# Patient Record
Sex: FEMALE | Race: WHITE
[De-identification: names, ages, dates, MRNs, and addresses within clinical notes are randomized per-mention and may not be internally consistent; named-entity substitution may affect disease eponyms.]

---

## 2017-01-13 ENCOUNTER — HOSPITAL ENCOUNTER (OUTPATIENT)
Dept: HOSPITAL 53 - M LABNEURO | Age: 62
Discharge: HOME | End: 2017-01-13
Attending: PSYCHIATRY & NEUROLOGY
Payer: COMMERCIAL

## 2017-01-13 DIAGNOSIS — G46.7: ICD-10-CM

## 2017-01-13 DIAGNOSIS — G40.009: ICD-10-CM

## 2017-01-13 DIAGNOSIS — M43.06: Primary | ICD-10-CM

## 2017-01-13 LAB
ALBUMIN SERPL BCG-MCNC: 3.7 GM/DL (ref 3.2–5.2)
ALBUMIN/GLOB SERPL: 1.23 {RATIO} (ref 1–1.93)
ALP SERPL-CCNC: 84 U/L (ref 45–117)
ALT SERPL W P-5'-P-CCNC: 24 U/L (ref 12–78)
ANION GAP SERPL CALC-SCNC: 7 MEQ/L (ref 8–16)
AST SERPL-CCNC: 11 U/L (ref 15–37)
BASOPHILS # BLD AUTO: 0 K/MM3 (ref 0–0.2)
BASOPHILS NFR BLD AUTO: 0.7 % (ref 0–1)
BILIRUB SERPL-MCNC: 0.4 MG/DL (ref 0.2–1)
BUN SERPL-MCNC: 17 MG/DL (ref 7–18)
CALCIUM SERPL-MCNC: 8.9 MG/DL (ref 8.8–10.2)
CHLORIDE SERPL-SCNC: 102 MEQ/L (ref 98–107)
CO2 SERPL-SCNC: 31 MEQ/L (ref 21–32)
CREAT SERPL-MCNC: 0.77 MG/DL (ref 0.55–1.02)
EOSINOPHIL # BLD AUTO: 0.1 K/MM3 (ref 0–0.5)
EOSINOPHIL NFR BLD AUTO: 1.8 % (ref 0–3)
ERYTHROCYTE [DISTWIDTH] IN BLOOD BY AUTOMATED COUNT: 11.9 % (ref 11.5–14.5)
GFR SERPL CREATININE-BSD FRML MDRD: > 60 ML/MIN/{1.73_M2} (ref 45–?)
GLUCOSE SERPL-MCNC: 97 MG/DL (ref 80–110)
LARGE UNSTAINED CELL #: 0.2 K/MM3 (ref 0–0.4)
LARGE UNSTAINED CELL %: 2.3 % (ref 0–4)
LYMPHOCYTES # BLD AUTO: 1.7 K/MM3 (ref 1.5–4.5)
LYMPHOCYTES NFR BLD AUTO: 25 % (ref 24–44)
MCH RBC QN AUTO: 32.4 PG (ref 27–33)
MCHC RBC AUTO-ENTMCNC: 33.5 G/DL (ref 32–36.5)
MCV RBC AUTO: 96.9 FL (ref 80–96)
MONOCYTES # BLD AUTO: 0.4 K/MM3 (ref 0–0.8)
MONOCYTES NFR BLD AUTO: 5.9 % (ref 0–5)
NEUTROPHILS # BLD AUTO: 4.4 K/MM3 (ref 1.8–7.7)
NEUTROPHILS NFR BLD AUTO: 64.3 % (ref 36–66)
PLATELET # BLD AUTO: 296 K/MM3 (ref 150–450)
POTASSIUM SERPL-SCNC: 4.3 MEQ/L (ref 3.5–5.1)
PROT SERPL-MCNC: 6.7 GM/DL (ref 6.4–8.2)
SODIUM SERPL-SCNC: 140 MEQ/L (ref 136–145)
WBC # BLD AUTO: 6.8 K/MM3 (ref 4–10)

## 2017-01-31 ENCOUNTER — HOSPITAL ENCOUNTER (OUTPATIENT)
Dept: HOSPITAL 53 - M WHC | Age: 62
End: 2017-01-31
Attending: NURSE PRACTITIONER
Payer: COMMERCIAL

## 2017-01-31 ENCOUNTER — HOSPITAL ENCOUNTER (OUTPATIENT)
Dept: HOSPITAL 53 - M SFHCWAGY | Age: 62
Discharge: HOME | End: 2017-01-31
Attending: NURSE PRACTITIONER
Payer: COMMERCIAL

## 2017-01-31 DIAGNOSIS — Z12.31: Primary | ICD-10-CM

## 2017-01-31 DIAGNOSIS — Z12.4: Primary | ICD-10-CM

## 2017-01-31 NOTE — REPMRS
Patient History

The patient states she had a clinical breast exam in 1-2017

Patient is postmenopausal.

Family history of endometrial cancer in mother at age 34 and 

breast cancer in maternal cousin at age 50 or over.

Benign excisional biopsy of the left breast.

 

Digital Woman Screen Mammo: January 31, 2017 - Exam #: 

[id number]

Bilateral CC and MLO view(s) were taken.

 

Technologist: Cinthya Edouard, Technologist

Prior study comparison: December 18, 2015, digital woman screen 

mammo performed at [facility].  November 25, 2014, 

digital woman screen mammo performed at [facility].

September 18, 2013, digital woman screen mammo performed at 

[facility].

 

FINDINGS: There are scattered fibroglandular densities.  There 

has been no change in the appearance of the mammogram from the 

prior studies.  There is a mild amount of scattered 

fibroglandular density which is fairly symmetric. There is no 

interval development of dominant mass, architectural distortion, 

or clustered microcalcification suggestive of malignancy.

 

ASSESSMENT: BI-RADS/ACR category 1 mammogram. Negative.

 

Recommendation

Routine screening mammogram in 1 year (for women over age 40).

This mammogram was interpreted with the aid of an FDA-approved 

computer-aided dectection system.

 

Electronically Signed By: Roddy Novoa MD 01/31/17 0407

## 2017-02-16 ENCOUNTER — HOSPITAL ENCOUNTER (OUTPATIENT)
Dept: HOSPITAL 53 - M SFHCLUC | Age: 62
Discharge: HOME | End: 2017-02-16
Attending: NURSE PRACTITIONER
Payer: COMMERCIAL

## 2017-02-16 DIAGNOSIS — J40: Primary | ICD-10-CM

## 2017-04-10 ENCOUNTER — HOSPITAL ENCOUNTER (OUTPATIENT)
Dept: HOSPITAL 53 - M SFHCLERA | Age: 62
End: 2017-04-10
Attending: NURSE PRACTITIONER
Payer: COMMERCIAL

## 2017-04-10 ENCOUNTER — HOSPITAL ENCOUNTER (OUTPATIENT)
Dept: HOSPITAL 53 - M WHC | Age: 62
End: 2017-04-10
Attending: PHYSICIAN ASSISTANT
Payer: COMMERCIAL

## 2017-04-10 DIAGNOSIS — M85.80: ICD-10-CM

## 2017-04-10 DIAGNOSIS — Z13.820: Primary | ICD-10-CM

## 2017-04-10 DIAGNOSIS — M81.0: ICD-10-CM

## 2017-04-10 DIAGNOSIS — R30.0: Primary | ICD-10-CM

## 2017-04-12 NOTE — DEXA
AP SPINE   L1 - L4   1.125   -0.6       0.9

LT FEMUR   TOTAL   0.816   -1.5      -0.4

RT FEMUR   TOTAL   0.839   -1.3      -0.8

TOTAL BODY   TOTAL

OTHER



DUAL FEMUR FRAX* ASSESSMENT

Risk factors:               EtOH/tobacco use.

10 year probability of fracture

Major osteoporotic fracture            18.5 %

Hip fracture                 7.6 %



COMMENTS:

Normal bone densitometry of the spine.

There is low bone density of the right hip.

There is osteoporosis of the left hip.

The density of the spine has increased 10.0% since 9/2013.

The density of the left hip has decreased 1.9% since 9/2013.

The density of the right hip has decreased 0.8% since 9/2003.

The increased density of the spine does represent a significant change.

The decreased density of the left hip does not represent a significant change.

The decreased density of the right hip does not represent a significant change.



FOLLOW-UP:

Recommendation for the next bone density exam: 2 years.
[facility]

## 2018-01-17 ENCOUNTER — HOSPITAL ENCOUNTER (OUTPATIENT)
Dept: HOSPITAL 53 - M LABNEURO | Age: 63
End: 2018-01-17
Attending: PSYCHIATRY & NEUROLOGY
Payer: COMMERCIAL

## 2018-01-17 DIAGNOSIS — M43.06: Primary | ICD-10-CM

## 2018-01-17 DIAGNOSIS — G40.009: ICD-10-CM

## 2018-01-17 DIAGNOSIS — G46.7: ICD-10-CM

## 2018-01-17 LAB
ALBUMIN/GLOBULIN RATIO: 1.29 (ref 1–1.93)
ALBUMIN: 4 GM/DL (ref 3.2–5.2)
ALKALINE PHOSPHATASE: 86 U/L (ref 45–117)
ALT SERPL W P-5'-P-CCNC: 29 U/L (ref 12–78)
ANION GAP: 8 MEQ/L (ref 8–16)
AST SERPL-CCNC: 21 U/L (ref 7–37)
BASO #: 0.1 10^3/UL (ref 0–0.2)
BASO %: 0.9 % (ref 0–1)
BILIRUBIN,TOTAL: 0.4 MG/DL (ref 0.2–1)
BLOOD UREA NITROGEN: 19 MG/DL (ref 7–18)
CALCIUM LEVEL: 9.3 MG/DL (ref 8.8–10.2)
CARBON DIOXIDE LEVEL: 28 MEQ/L (ref 21–32)
CHLORIDE LEVEL: 102 MEQ/L (ref 98–107)
CREATININE FOR GFR: 0.77 MG/DL (ref 0.55–1.02)
EOS #: 0.1 10^3/UL (ref 0–0.5)
EOSINOPHIL NFR BLD AUTO: 2.4 % (ref 0–3)
EST. AVERAGE GLUCOSE BLD GHB EST-MCNC: 108 MG/DL (ref 60–110)
GFR SERPL CREATININE-BSD FRML MDRD: > 60 ML/MIN/{1.73_M2} (ref 45–?)
GLUCOSE, FASTING: 112 MG/DL (ref 80–110)
HEMATOCRIT: 41.5 % (ref 36–47)
HEMOGLOBIN: 14 G/DL (ref 12–16)
IMMATURE GRANULOCYTE #: 0 10^3/UL (ref 0–0)
IMMATURE GRANULOCYTE %: 0.2 % (ref 0–0)
LYMPH #: 1.8 10^3/UL (ref 1.5–4.5)
LYMPH %: 32 % (ref 24–44)
MEAN CORPUSCULAR HEMOGLOBIN: 32.3 PG (ref 27–33)
MEAN CORPUSCULAR HGB CONC: 33.7 G/DL (ref 32–36.5)
MEAN CORPUSCULAR VOLUME: 95.8 FL (ref 80–96)
MONO #: 0.6 10^3/UL (ref 0–0.8)
MONO %: 10.1 % (ref 0–5)
NEUTROPHILS #: 3.1 10^3/UL (ref 1.8–7.7)
NEUTROPHILS %: 54.4 % (ref 36–66)
NRBC BLD AUTO-RTO: 0 % (ref 0–0)
PLATELET COUNT, AUTOMATED: 280 10^3/UL (ref 150–450)
POTASSIUM SERUM: 4.2 MEQ/L (ref 3.5–5.1)
RED BLOOD COUNT: 4.33 10^6/UL (ref 4–5.4)
RED CELL DISTRIBUTION WIDTH: 12.7 % (ref 11.5–14.5)
SODIUM LEVEL: 138 MEQ/L (ref 136–145)
TOTAL PROTEIN: 7.1 GM/DL (ref 6.4–8.2)
WHITE BLOOD COUNT: 5.7 10^3/UL (ref 4–10)

## 2018-01-20 LAB — LAMOTRIGINE SERPL-MCNC: 8.5 UG/ML (ref 2–20)

## 2018-03-20 ENCOUNTER — HOSPITAL ENCOUNTER (OUTPATIENT)
Dept: HOSPITAL 53 - M SFHCWAGY | Age: 63
End: 2018-03-20
Attending: NURSE PRACTITIONER
Payer: COMMERCIAL

## 2018-03-20 ENCOUNTER — HOSPITAL ENCOUNTER (OUTPATIENT)
Dept: HOSPITAL 53 - M WHC | Age: 63
End: 2018-03-20
Attending: FAMILY MEDICINE
Payer: COMMERCIAL

## 2018-03-20 DIAGNOSIS — Z98.890: ICD-10-CM

## 2018-03-20 DIAGNOSIS — Z78.0: ICD-10-CM

## 2018-03-20 DIAGNOSIS — Z12.4: Primary | ICD-10-CM

## 2018-03-20 DIAGNOSIS — Z12.31: Primary | ICD-10-CM

## 2018-03-20 PROCEDURE — 77067 SCR MAMMO BI INCL CAD: CPT

## 2019-01-08 ENCOUNTER — HOSPITAL ENCOUNTER (OUTPATIENT)
Dept: HOSPITAL 53 - M LRY | Age: 64
End: 2019-01-08
Attending: FAMILY MEDICINE
Payer: COMMERCIAL

## 2019-01-08 DIAGNOSIS — M47.817: ICD-10-CM

## 2019-01-08 DIAGNOSIS — M70.61: Primary | ICD-10-CM

## 2019-01-08 DIAGNOSIS — M54.5: ICD-10-CM

## 2019-01-09 NOTE — REP
Clinical:  Lower back pain .

 

Technique:  AP, lateral, flexion/extension , bilateral oblique, and coned-down

views.

 

Findings:  Alignment and lordosis is maintained.  The vertebral bodies including

transverse process and spinous processes are intact and there is no evidence for

acute fracture / compression injury or subluxation.  Early advanced multilevel

degenerative changes include endplate sclerosis, disc space narrowing, and

hypertrophic facet changes most notably at the L5-S1, L3-4, L4-5.  No

spondylolysis.

 

Impression:

Early advanced multilevel degenerative spondylosis.

 

 

Electronically Signed by

Wilmer Carrasco MD 01/09/2019 02:43 A

## 2019-01-09 NOTE — REP
Clinical:  Bursitis.

 

Technique:  Neutral and frog lateral views of the right hip.

 

Findings:

Osseous structures, joint spaces, and surrounding soft tissues appear normal.  No

acute fracture dislocation.  No overt arthritic changes noted.

 

Impression:

Normal, age-appropriate right hip radiographs.

 

 

Electronically Signed by

Wilmer Carrasco MD 01/09/2019 03:57 A

## 2019-03-28 ENCOUNTER — HOSPITAL ENCOUNTER (OUTPATIENT)
Dept: HOSPITAL 53 - M WHC | Age: 64
End: 2019-03-28
Attending: NURSE PRACTITIONER
Payer: COMMERCIAL

## 2019-03-28 DIAGNOSIS — Z12.31: Primary | ICD-10-CM

## 2019-03-28 DIAGNOSIS — Z78.0: ICD-10-CM

## 2019-03-28 DIAGNOSIS — Z80.3: ICD-10-CM

## 2019-03-28 NOTE — REPMRS
Patient History

The patient states she had a clinical breast exam in 03/2019.

Patient is postmenopausal.

Family history of breast cancer at age 50 or over in maternal 

cousin, endometrial cancer at age 34 in mother.

Benign excisional biopsy of the left breast.

No Hormone Replacement Therapy

 

3D TOMOSYNTHESIS WAS PERFORMED.

 

Digital Woman Screen Mammo: March 28, 2019 - Exam #: 

[id number]

Bilateral CC and MLO view(s) were taken.

 

Technologist: Ania Zarate, Technologist

Prior study comparison: March 20, 2018, digital woman screen 

mammo performed at [facility].  January 31, 2017, digital woman screen mammo performed at [facility].

 

FINDINGS: The breast tissue is heterogeneously dense.  This may 

lower the sensitivity of mammography.  There has been no change 

in the appearance of the mammogram from the prior studies.  There

is a moderate amount of residual fibroglandular tissue which is 

fairly symmetric. There is no interval development of dominant 

mass, areas of architectural distortion, or clustered 

microcalcification typical of malignancy.

 

Assessment: BI-RADS/ACR category 1 mammogram. Negative Mammogram.

 

Recommendation

Routine screening mammogram in 1 year (for women over age 40).

This mammogram was interpreted with the aid of an FDA-approved 

computer-aided dectection system.

 

Electronically Signed By: Urban Boone MD 03/28/19 [id number]

## 2019-04-24 ENCOUNTER — HOSPITAL ENCOUNTER (OUTPATIENT)
Dept: HOSPITAL 53 - M WHC | Age: 64
End: 2019-04-24
Attending: NURSE PRACTITIONER
Payer: COMMERCIAL

## 2019-04-24 DIAGNOSIS — F17.200: ICD-10-CM

## 2019-04-24 DIAGNOSIS — Z78.0: ICD-10-CM

## 2019-04-24 DIAGNOSIS — M81.8: Primary | ICD-10-CM

## 2019-04-25 NOTE — DEXA
AP SPINE   L1 - L4   1.108   -0.7       0.8

LT FEMUR   TOTAL   0.845   -1.3      -0.2

LT NECK      0.712   -2.3      -0.9

RT FEMUR   TOTAL   0.836   -1.4      -0.2      

RT NECK      0.708   -2.4      -1.0

TOTAL BODY   TOTAL

OTHER



COMMENTS:

Normal bone densitometry of the spine.

There is low bone density of the hips.

The density of the spine has increased 8.3% since the initial exam on 
09/18/2013.

The spine density has decreased 1.5% since the most recent exam on 04/10/2017.

The density of the left hip has increased 1.6% since the initial exam on 
09/18/2013.

The density of the left hip has increased 3.6% since the most recent exam on 
04/10/2017.

The density of the right hip has decreased 1.2% since the initial exam on 
09/18/2013.

The density of the right hip has decreased her 0.4% since the most recent exam 
on 04/10/2017.



FOLLOW-UP:

Recommendation for the next bone density exam: 2 years.



[facility]

## 2019-10-28 ENCOUNTER — HOSPITAL ENCOUNTER (OUTPATIENT)
Dept: HOSPITAL 53 - M SFHCLERA | Age: 64
End: 2019-10-28
Attending: NURSE PRACTITIONER
Payer: COMMERCIAL

## 2019-10-28 DIAGNOSIS — J04.0: Primary | ICD-10-CM

## 2019-11-01 ENCOUNTER — HOSPITAL ENCOUNTER (OUTPATIENT)
Dept: HOSPITAL 53 - M LRY | Age: 64
End: 2019-11-01
Attending: FAMILY MEDICINE
Payer: COMMERCIAL

## 2019-11-01 DIAGNOSIS — R05: Primary | ICD-10-CM

## 2019-11-01 NOTE — REP
Clinical:  Productive cough .

 

Comparison: 06/24/2014

 

Technique:  PA and lateral.

 

Findings:

The mediastinum and cardiac silhouette are normal.  The lung fields are clear and

without acute consolidation, effusion, or pneumothorax.  The skeletal structures

are intact and normal.

 

Impression:

1.   No acute cardiopulmonary process.

 

 

Electronically Signed by

Wilmer Carrasco MD 11/01/2019 12:26 P

## 2019-12-02 ENCOUNTER — HOSPITAL ENCOUNTER (OUTPATIENT)
Dept: HOSPITAL 53 - M SFHCLERA | Age: 64
End: 2019-12-02
Attending: FAMILY MEDICINE
Payer: COMMERCIAL

## 2019-12-02 DIAGNOSIS — G40.909: ICD-10-CM

## 2019-12-02 DIAGNOSIS — Z90.09: ICD-10-CM

## 2019-12-02 DIAGNOSIS — M47.816: Primary | ICD-10-CM

## 2019-12-02 DIAGNOSIS — Z13.220: ICD-10-CM

## 2019-12-02 LAB
ALBUMIN SERPL BCG-MCNC: 4 GM/DL (ref 3.2–5.2)
ALT SERPL W P-5'-P-CCNC: 28 U/L (ref 12–78)
BILIRUB CONJ SERPL-MCNC: 0.1 MG/DL (ref 0–0.2)
BILIRUB SERPL-MCNC: 0.4 MG/DL (ref 0.2–1)
BUN SERPL-MCNC: 14 MG/DL (ref 7–18)
CALCIUM SERPL-MCNC: 9.1 MG/DL (ref 8.8–10.2)
CHLORIDE SERPL-SCNC: 102 MEQ/L (ref 98–107)
CHOLEST SERPL-MCNC: 207 MG/DL (ref ?–200)
CHOLEST/HDLC SERPL: 2.23 {RATIO} (ref ?–5)
CO2 SERPL-SCNC: 31 MEQ/L (ref 21–32)
CREAT SERPL-MCNC: 0.72 MG/DL (ref 0.55–1.3)
GFR SERPL CREATININE-BSD FRML MDRD: > 60 ML/MIN/{1.73_M2} (ref 45–?)
GLUCOSE SERPL-MCNC: 88 MG/DL (ref 70–100)
HDLC SERPL-MCNC: 93 MG/DL (ref 40–?)
LDLC SERPL CALC-MCNC: 87 MG/DL (ref ?–100)
NONHDLC SERPL-MCNC: 114 MG/DL
POTASSIUM SERPL-SCNC: 4.3 MEQ/L (ref 3.5–5.1)
PROT SERPL-MCNC: 7.2 GM/DL (ref 6.4–8.2)
SODIUM SERPL-SCNC: 138 MEQ/L (ref 136–145)
TRIGL SERPL-MCNC: 135 MG/DL (ref ?–150)
TSH SERPL DL<=0.005 MIU/L-ACNC: 0.77 UIU/ML (ref 0.36–3.74)

## 2020-06-09 ENCOUNTER — HOSPITAL ENCOUNTER (OUTPATIENT)
Dept: HOSPITAL 53 - M SFHCWAGY | Age: 65
End: 2020-06-09
Attending: NURSE PRACTITIONER
Payer: COMMERCIAL

## 2020-06-09 ENCOUNTER — HOSPITAL ENCOUNTER (OUTPATIENT)
Dept: HOSPITAL 53 - M WHC | Age: 65
End: 2020-06-09
Attending: NURSE PRACTITIONER
Payer: COMMERCIAL

## 2020-06-09 DIAGNOSIS — Z80.3: ICD-10-CM

## 2020-06-09 DIAGNOSIS — Z12.31: Primary | ICD-10-CM

## 2020-06-09 DIAGNOSIS — Z80.49: ICD-10-CM

## 2020-06-09 DIAGNOSIS — Z12.4: Primary | ICD-10-CM

## 2020-06-09 NOTE — REPMRS
Patient History

The patient states she had a clinical breast exam in June 2020.

 

Family history of breast cancer at age 50 or over in maternal 

cousin, endometrial cancer at age 34 in mother.

Benign excisional biopsy of the left breast.

No Hormone Replacement Therapy

 

3D TOMOSYNTHESIS WAS PERFORMED.

 

[facility] lifetime risk for breast cancer is 5.8%.

 

VOLPARA DENSITY A.

 

Digital Woman Screen Mammo: June 9, 2020 - Exam #: 

[id number]

Bilateral CC and MLO view(s) were taken.

 

Technologist: Madeline Burr, Technologist

Prior study comparison: March 28, 2019, bilateral digital woman 

screen mammo performed at [facility].  March 20, 2018, digital woman screen mammo 

performed at [facility].

 

FINDINGS: There are scattered fibroglandular densities.  There 

has been no change in the appearance of the mammogram from the 

prior studies.  There is a mild amount of residual fibroglandular

tissue which is fairly symmetric. There is no interval 

development of dominant mass, architectural distortion, or 

clustered microcalcification suggestive of malignancy.

 

Assessment: BI-RADS/ACR category 1 mammogram. Negative Mammogram.

 

Recommendation

Routine screening mammogram in 1 year (for women over age 40).

This mammogram was interpreted with the aid of an FDA-approved 

computer-aided dectection system.

 

Electronically Signed By: Urban Boone MD 06/09/20 [id number]

## 2020-06-12 ENCOUNTER — HOSPITAL ENCOUNTER (OUTPATIENT)
Dept: HOSPITAL 53 - M SFHCLERA | Age: 65
End: 2020-06-12
Attending: FAMILY MEDICINE
Payer: COMMERCIAL

## 2020-06-12 DIAGNOSIS — I10: ICD-10-CM

## 2020-06-12 DIAGNOSIS — E78.5: Primary | ICD-10-CM

## 2020-06-12 LAB
BUN SERPL-MCNC: 16 MG/DL (ref 7–18)
CALCIUM SERPL-MCNC: 9.4 MG/DL (ref 8.8–10.2)
CHLORIDE SERPL-SCNC: 103 MEQ/L (ref 98–107)
CHOLEST SERPL-MCNC: 152 MG/DL (ref ?–200)
CHOLEST/HDLC SERPL: 1.58 {RATIO} (ref ?–5)
CO2 SERPL-SCNC: 31 MEQ/L (ref 21–32)
CREAT SERPL-MCNC: 0.93 MG/DL (ref 0.55–1.3)
GFR SERPL CREATININE-BSD FRML MDRD: > 60 ML/MIN/{1.73_M2} (ref 45–?)
GLUCOSE SERPL-MCNC: 101 MG/DL (ref 70–100)
HDLC SERPL-MCNC: 96 MG/DL (ref 40–?)
LDLC SERPL CALC-MCNC: 45 MG/DL (ref ?–100)
NONHDLC SERPL-MCNC: 56 MG/DL
POTASSIUM SERPL-SCNC: 4.4 MEQ/L (ref 3.5–5.1)
SODIUM SERPL-SCNC: 136 MEQ/L (ref 136–145)
TRIGL SERPL-MCNC: 55 MG/DL (ref ?–150)

## 2020-06-16 ENCOUNTER — HOSPITAL ENCOUNTER (OUTPATIENT)
Dept: HOSPITAL 53 - M LRY | Age: 65
End: 2020-06-16
Attending: FAMILY MEDICINE
Payer: COMMERCIAL

## 2020-06-16 DIAGNOSIS — M19.031: ICD-10-CM

## 2020-06-16 DIAGNOSIS — M25.531: Primary | ICD-10-CM

## 2020-06-16 NOTE — REP
REASON FOR EXAM: Wrist pain.  No trauma.  No priors.

 

There is no evidence of acute fracture or destructive osseous lesion.  There are

degenerative changes seen particularly laterally.

 

 

Electronically Signed by

Joseph Neri DO 06/16/2020 05:04 P

## 2020-07-29 ENCOUNTER — HOSPITAL ENCOUNTER (OUTPATIENT)
Dept: HOSPITAL 53 - M OT | Age: 65
LOS: 2 days | Discharge: HOME | End: 2020-07-31
Attending: PHYSICIAN ASSISTANT
Payer: MEDICARE

## 2020-07-29 DIAGNOSIS — M25.531: Primary | ICD-10-CM

## 2020-07-30 ENCOUNTER — HOSPITAL ENCOUNTER (OUTPATIENT)
Dept: HOSPITAL 53 - M LABSMT | Age: 65
End: 2020-07-30
Attending: PHYSICIAN ASSISTANT
Payer: MEDICARE

## 2020-07-30 ENCOUNTER — HOSPITAL ENCOUNTER (OUTPATIENT)
Dept: HOSPITAL 53 - M LABSMT | Age: 65
End: 2020-07-30
Attending: FAMILY MEDICINE
Payer: MEDICARE

## 2020-07-30 ENCOUNTER — HOSPITAL ENCOUNTER (OUTPATIENT)
Dept: HOSPITAL 53 - M LABSMT | Age: 65
End: 2020-07-30
Attending: PSYCHIATRY & NEUROLOGY
Payer: MEDICARE

## 2020-07-30 DIAGNOSIS — G40.89: ICD-10-CM

## 2020-07-30 DIAGNOSIS — G40.89: Primary | ICD-10-CM

## 2020-07-30 DIAGNOSIS — M19.031: Primary | ICD-10-CM

## 2020-07-30 DIAGNOSIS — Z01.84: Primary | ICD-10-CM

## 2020-08-20 ENCOUNTER — HOSPITAL ENCOUNTER (OUTPATIENT)
Dept: HOSPITAL 53 - M OT | Age: 65
LOS: 11 days | End: 2020-08-31
Attending: PHYSICIAN ASSISTANT
Payer: MEDICARE

## 2020-08-20 DIAGNOSIS — M25.531: Primary | ICD-10-CM

## 2020-08-20 DIAGNOSIS — Z47.89: ICD-10-CM

## 2020-08-27 LAB
BASOPHILS # BLD AUTO: 0.1 10^3/UL (ref 0–0.2)
BASOPHILS NFR BLD AUTO: 0.8 % (ref 0–1)
EOSINOPHIL # BLD AUTO: 0.2 10^3/UL (ref 0–0.5)
EOSINOPHIL NFR BLD AUTO: 2.6 % (ref 0–3)
HCT VFR BLD AUTO: 41.4 % (ref 36–47)
HGB BLD-MCNC: 13.7 G/DL (ref 12–15.5)
LYMPHOCYTES # BLD AUTO: 1.8 10^3/UL (ref 1.5–5)
LYMPHOCYTES NFR BLD AUTO: 29.1 % (ref 24–44)
MCH RBC QN AUTO: 33.2 PG (ref 27–33)
MCHC RBC AUTO-ENTMCNC: 33.1 G/DL (ref 32–36.5)
MCV RBC AUTO: 100.2 FL (ref 80–96)
MONOCYTES # BLD AUTO: 0.6 10^3/UL (ref 0–0.8)
MONOCYTES NFR BLD AUTO: 9.5 % (ref 0–5)
NEUTROPHILS # BLD AUTO: 3.5 10^3/UL (ref 1.5–8.5)
NEUTROPHILS NFR BLD AUTO: 57.8 % (ref 36–66)
PLATELET # BLD AUTO: 302 10^3/UL (ref 150–450)
RBC # BLD AUTO: 4.13 10^6/UL (ref 4–5.4)
WBC # BLD AUTO: 6.1 10^3/UL (ref 4–10)

## 2020-08-30 LAB
ALBUMIN SERPL BCG-MCNC: 4 GM/DL (ref 3.2–5.2)
ALT SERPL W P-5'-P-CCNC: 27 U/L (ref 12–78)
BILIRUB SERPL-MCNC: 0.5 MG/DL (ref 0.2–1)
BUN SERPL-MCNC: 13 MG/DL (ref 7–18)
BUN SERPL-MCNC: 14 MG/DL (ref 7–18)
CALCIUM SERPL-MCNC: 8.8 MG/DL (ref 8.8–10.2)
CHLORIDE SERPL-SCNC: 103 MEQ/L (ref 98–107)
CO2 SERPL-SCNC: 30 MEQ/L (ref 21–32)
CREAT SERPL-MCNC: 0.71 MG/DL (ref 0.55–1.3)
CREAT SERPL-MCNC: 0.74 MG/DL (ref 0.55–1.3)
GFR SERPL CREATININE-BSD FRML MDRD: > 60 ML/MIN/{1.73_M2} (ref 45–?)
GFR SERPL CREATININE-BSD FRML MDRD: > 60 ML/MIN/{1.73_M2} (ref 45–?)
GLUCOSE SERPL-MCNC: 88 MG/DL (ref 70–100)
POTASSIUM SERPL-SCNC: 4.7 MEQ/L (ref 3.5–5.1)
PROT SERPL-MCNC: 7.1 GM/DL (ref 6.4–8.2)
SODIUM SERPL-SCNC: 139 MEQ/L (ref 136–145)

## 2020-09-02 LAB
HBV SURFACE AB SER QL: NEGATIVE
HBV SURFACE AB SER-ACNC: NEGATIVE M[IU]/ML

## 2020-09-30 ENCOUNTER — HOSPITAL ENCOUNTER (OUTPATIENT)
Dept: HOSPITAL 53 - M OT | Age: 65
End: 2020-09-30
Attending: PHYSICIAN ASSISTANT
Payer: MEDICARE

## 2020-09-30 DIAGNOSIS — M25.531: Primary | ICD-10-CM

## 2020-10-01 ENCOUNTER — HOSPITAL ENCOUNTER (OUTPATIENT)
Dept: HOSPITAL 53 - M OT | Age: 65
LOS: 30 days | End: 2020-10-31
Attending: PHYSICIAN ASSISTANT
Payer: MEDICARE

## 2020-10-01 DIAGNOSIS — M25.531: Primary | ICD-10-CM

## 2021-04-16 ENCOUNTER — HOSPITAL ENCOUNTER (OUTPATIENT)
Dept: HOSPITAL 53 - M PLARAD | Age: 66
End: 2021-04-16
Attending: PHYSICIAN ASSISTANT
Payer: MEDICARE

## 2021-04-16 DIAGNOSIS — M47.817: Primary | ICD-10-CM

## 2021-04-16 NOTE — REP
INDICATION:

SPONDYLOSIS W MYELOPATHY L REGION.



COMPARISON:

MRI lumbar spine 07/11/2019.



TECHNIQUE:

Sagittal T1, T2, STIR, axial T1 and T2 weighted images obtained.



FINDINGS:

There is mild-to-moderate multilevel degenerative disc disease with loss of disc

height and disc desiccation seen diffusely throughout the lumbar spine.  Vertebral

heights overall preserved.  No dipti malalignments.  Conus ends normally at L1 level.

On the sagittal T2 weighted images, no new no definite high-grade canal stenosis.



On the review of axial images,



At L1-2 no significant canal or foraminal narrowing.

At L2-3 global disc bulge with mild right and moderate left foraminal narrowing and

mild-to-moderate canal stenosis.

At L3-4 global disc bulge with mild-to-moderate canal narrowing and moderate bilateral

foraminal narrowing.

At L4-5 global disc bulge, with mild canal narrowing, and moderate bilateral foraminal

narrowing greater on the right.

At L5-S1 loss of disc height with moderate right and moderate-to-severe left foraminal

narrowing and mild canal stenosis.



When compared to prior study 07/11/2019, no gross changes.



IMPRESSION:

1. Mild-to-moderate multilevel degenerative disc disease.

2. No high-grade or limiting canal stenosis.  No focal disc herniation.

3. Degenerative disc disease at multiple levels, more progressed at L5-S1.  At L5-S1

loss of disc height with moderate right and moderate-to-severe left foraminal

narrowing and mild canal stenosis.

4. When compared to prior study 07/11/2019, no gross changes.





<Electronically signed by César Jackson > 04/16/21 0944

## 2021-04-26 ENCOUNTER — HOSPITAL ENCOUNTER (OUTPATIENT)
Dept: HOSPITAL 53 - M RAD | Age: 66
End: 2021-04-26
Attending: FAMILY MEDICINE
Payer: MEDICARE

## 2021-04-26 ENCOUNTER — HOSPITAL ENCOUNTER (OUTPATIENT)
Dept: HOSPITAL 53 - M LAB | Age: 66
End: 2021-04-26
Attending: FAMILY MEDICINE
Payer: MEDICARE

## 2021-04-26 ENCOUNTER — HOSPITAL ENCOUNTER (OUTPATIENT)
Dept: HOSPITAL 53 - M LAB | Age: 66
End: 2021-04-26
Attending: PHYSICIAN ASSISTANT
Payer: MEDICARE

## 2021-04-26 DIAGNOSIS — M47.817: Primary | ICD-10-CM

## 2021-04-26 DIAGNOSIS — M47.817: ICD-10-CM

## 2021-04-26 DIAGNOSIS — E78.5: ICD-10-CM

## 2021-04-26 DIAGNOSIS — Z13.1: ICD-10-CM

## 2021-04-26 DIAGNOSIS — Z13.1: Primary | ICD-10-CM

## 2021-04-26 DIAGNOSIS — Z12.2: Primary | ICD-10-CM

## 2021-04-26 LAB
ALBUMIN SERPL BCG-MCNC: 4.1 GM/DL (ref 3.2–5.2)
ALT SERPL W P-5'-P-CCNC: 21 U/L (ref 12–78)
BILIRUB SERPL-MCNC: 0.3 MG/DL (ref 0.2–1)
BUN SERPL-MCNC: 20 MG/DL (ref 7–18)
BUN SERPL-MCNC: 21 MG/DL (ref 7–18)
CALCIUM SERPL-MCNC: 9.1 MG/DL (ref 8.8–10.2)
CHLORIDE SERPL-SCNC: 102 MEQ/L (ref 98–107)
CHOLEST SERPL-MCNC: 173 MG/DL (ref ?–200)
CHOLEST/HDLC SERPL: 1.53 {RATIO} (ref ?–5)
CO2 SERPL-SCNC: 31 MEQ/L (ref 21–32)
CREAT SERPL-MCNC: 0.56 MG/DL (ref 0.55–1.3)
CREAT SERPL-MCNC: 0.63 MG/DL (ref 0.55–1.3)
GFR SERPL CREATININE-BSD FRML MDRD: > 60 ML/MIN/{1.73_M2} (ref 45–?)
GFR SERPL CREATININE-BSD FRML MDRD: > 60 ML/MIN/{1.73_M2} (ref 45–?)
GLUCOSE SERPL-MCNC: 98 MG/DL (ref 70–100)
HDLC SERPL-MCNC: 113 MG/DL (ref 40–?)
LDLC SERPL CALC-MCNC: 50 MG/DL (ref ?–100)
NONHDLC SERPL-MCNC: 60 MG/DL
POTASSIUM SERPL-SCNC: 4.5 MEQ/L (ref 3.5–5.1)
PROT SERPL-MCNC: 7.4 GM/DL (ref 6.4–8.2)
SODIUM SERPL-SCNC: 138 MEQ/L (ref 136–145)
TRIGL SERPL-MCNC: 51 MG/DL (ref ?–150)

## 2021-04-26 NOTE — REP
INDICATION:

SCREENING FOR LUNG CA.



COMPARISON:

None.



TECHNIQUE:

Axial noncontrast images from the thoracic inlet to the upper abdomen using low-dose

lung screening technique (LDCT).  As per the protocol only lung window images were

sent to the read station for interpretation



FINDINGS:

There are no abnormal nodules, masses, or opacities.



Grossly, the mediastinum and pulmonary sim are within normal limits.



Grossly, the imaged upper abdomen and imaged osseous structures are within normal

limits.



There is no evidence of a pleural effusion or pericardial effusion.



IMPRESSION:

Negative lung rads category 1 low-dose screening CT of the chest.





<Electronically signed by Joseph Neri > 04/26/21 [id number]

## 2021-10-15 ENCOUNTER — HOSPITAL ENCOUNTER (OUTPATIENT)
Dept: HOSPITAL 53 - M WHC | Age: 66
End: 2021-10-15
Attending: FAMILY MEDICINE
Payer: MEDICARE

## 2021-10-15 DIAGNOSIS — Z12.31: Primary | ICD-10-CM

## 2021-10-15 DIAGNOSIS — M81.0: ICD-10-CM

## 2021-10-15 NOTE — REPMRS
Patient History

The patient states she has not had a clinical breast exam in over

a year.

Family history of breast cancer at age 50 or over in maternal 

cousin, endometrial cancer at age 34 in mother.

Benign excisional biopsy of the left breast.

No Hormone Replacement Therapy

 

Tomosynthesis is performed.

 

Volpara breast density is b.

 

[facility] lifetime risk of breast cancer 5.2%.

Patient states no breast complaints today. Patient has signed MRS

History Sheet.

 

Digital Woman Screen Mammo: October 15, 2021 - Exam #: 

[id number]

Bilateral CC and MLO view(s) were taken.

 

Technologist: Sylwia Sosa, Technologist

Prior study comparison: June 9, 2020, bilateral digital woman 

screen mammo performed at [facility] 

[facility].  March 28, 2019, bilateral digital woman screen mammo 

performed at [facility].

 

FINDINGS: There are scattered fibroglandular densities.  There 

has been no change in the appearance of the mammogram from the 

prior studies.  There is a mild amount of residual fibroglandular

tissue which is fairly symmetric. There is no interval 

development of dominant mass, architectural distortion, or 

clustered microcalcification suggestive of malignancy.

 

Assessment: BI-RADS/ACR category 1 mammogram. Negative Mammogram.

 

Recommendation

Routine screening mammogram in 1 year (for women over age 40).

This mammogram was interpreted with the aid of an FDA-approved 

computer-aided dectection system.

 

Electronically Signed By: Urban Boone MD 10/15/21 2221

## 2021-10-15 NOTE — DEXAMM
INDICATION:

OSTEOPENIA.



COMPARISON:

04/24/2019, 04/10/2017, 09/18/2013.



TECHNIQUE:

Bone density was measured using dual-energy x-ray absorptiometry (DEXA).



FINDINGS:

AP SPINE L1-L4

BMD 1.100 g/cm2

Young Adult T-Score -0.8

Age Matched Z-Score 0.8.



LT FEMUR, TOTAL

BMD 0.836 g/cm2

Young Adult T-Score -1.4

Age Matched Z-Score -0.1.



LT NECK

BMD 0.696 g/cm2

Young Adult T-Score -2.5

Age Matched Z-Score -1.0.



RT FEMUR, TOTAL

BMD 0.848 g/cm2

Young Adult T-Score -1.3

Age Matched Z-Score 0.0.



RT NECK

BMD 0.754 g/cm2

Young Adult T-Score -2.0

Age Matched Z-Score -0.5.



IMPRESSION:

There is normal bone density of the spine.



There is low bone density of the left hip.





There is low bone density of the right hip.



The density of the spine has increased 7.5% since the initial exam on 09/18/2013.





The density of the spine decreased 0.7% since most recent exam on 04/24/2019.





The density of the left hip has increased 0.5% since initial exam on 09/18/2013.





The density of the left hip has decreased 1.1% since most recent exam on 04/24/2019.





The density of the right hip has increased 0.2% since the initial exam on 09/18/2013.





The density of the right hip has increased 1.4% since the most recent exam on

04/24/2019.



FOLLOW-UP:

Recommendation for the next bone density exam: 2 years.





<Electronically signed by Urban Boone > 10/15/21 [id number]

## 2022-02-16 ENCOUNTER — HOSPITAL ENCOUNTER (OUTPATIENT)
Dept: HOSPITAL 53 - M LAB | Age: 67
End: 2022-02-16
Attending: FAMILY MEDICINE
Payer: MEDICARE

## 2022-02-16 DIAGNOSIS — R53.83: Primary | ICD-10-CM

## 2022-02-16 LAB
25(OH)D3 SERPL-MCNC: 20.1 NG/ML (ref 30–100)
BUN SERPL-MCNC: 24 MG/DL (ref 7–18)
CALCIUM SERPL-MCNC: 9.3 MG/DL (ref 8.8–10.2)
CHLORIDE SERPL-SCNC: 103 MEQ/L (ref 98–107)
CO2 SERPL-SCNC: 27 MEQ/L (ref 21–32)
CREAT SERPL-MCNC: 0.72 MG/DL (ref 0.55–1.3)
GFR SERPL CREATININE-BSD FRML MDRD: > 60 ML/MIN/{1.73_M2} (ref 45–?)
GLUCOSE SERPL-MCNC: 99 MG/DL (ref 70–100)
HCT VFR BLD AUTO: 40 % (ref 36–47)
HGB BLD-MCNC: 13.2 G/DL (ref 12–15.5)
LYMPHOCYTES NFR BLD MANUAL: 22 % (ref 16–44)
MCH RBC QN AUTO: 32.9 PG (ref 27–33)
MCHC RBC AUTO-ENTMCNC: 33 G/DL (ref 32–36.5)
MCV RBC AUTO: 99.8 FL (ref 80–96)
MONOCYTES NFR BLD MANUAL: 10 % (ref 0–5)
NEUTROPHILS NFR BLD MANUAL: 58 % (ref 28–66)
PLATELET # BLD AUTO: 272 10^3/UL (ref 150–450)
PLATELET BLD QL SMEAR: NORMAL
POTASSIUM SERPL-SCNC: 4.7 MEQ/L (ref 3.5–5.1)
RBC # BLD AUTO: 4.01 10^6/UL (ref 4–5.4)
RBC MORPH BLD: NORMAL
SODIUM SERPL-SCNC: 138 MEQ/L (ref 136–145)
TSH SERPL DL<=0.005 MIU/L-ACNC: 0.95 UIU/ML (ref 0.36–3.74)
VARIANT LYMPHS NFR BLD MANUAL: 10 % (ref 0–5)
WBC # BLD AUTO: 6.6 10^3/UL (ref 4–10)

## 2022-02-22 ENCOUNTER — HOSPITAL ENCOUNTER (OUTPATIENT)
Dept: HOSPITAL 53 - M RAD | Age: 67
End: 2022-02-22
Attending: FAMILY MEDICINE
Payer: MEDICARE

## 2022-02-22 DIAGNOSIS — M25.471: Primary | ICD-10-CM

## 2022-02-22 DIAGNOSIS — M25.571: ICD-10-CM

## 2022-10-20 ENCOUNTER — HOSPITAL ENCOUNTER (OUTPATIENT)
Dept: HOSPITAL 53 - M WHC | Age: 67
End: 2022-10-20
Attending: FAMILY MEDICINE
Payer: MEDICARE

## 2022-10-20 DIAGNOSIS — Z12.31: Primary | ICD-10-CM

## 2023-01-24 ENCOUNTER — HOSPITAL ENCOUNTER (OUTPATIENT)
Dept: HOSPITAL 53 - M RAD | Age: 68
End: 2023-01-24
Attending: FAMILY MEDICINE
Payer: MEDICARE

## 2023-01-24 DIAGNOSIS — Z87.891: Primary | ICD-10-CM

## 2023-02-15 ENCOUNTER — HOSPITAL ENCOUNTER (OUTPATIENT)
Dept: HOSPITAL 53 - M LAB | Age: 68
End: 2023-02-15
Attending: PSYCHIATRY & NEUROLOGY
Payer: COMMERCIAL

## 2023-02-15 ENCOUNTER — HOSPITAL ENCOUNTER (OUTPATIENT)
Dept: HOSPITAL 53 - M LAB | Age: 68
End: 2023-02-15
Attending: FAMILY MEDICINE
Payer: MEDICARE

## 2023-02-15 DIAGNOSIS — E53.8: Primary | ICD-10-CM

## 2023-02-15 DIAGNOSIS — E07.9: ICD-10-CM

## 2023-02-15 DIAGNOSIS — E66.3: Primary | ICD-10-CM

## 2023-02-15 LAB
25(OH)D3 SERPL-MCNC: 23.3 NG/ML (ref 20–100)
25(OH)D3 SERPL-MCNC: 24.9 NG/ML (ref 20–100)
ALBUMIN SERPL BCG-MCNC: 3.8 G/DL (ref 3.2–5.2)
ALBUMIN SERPL BCG-MCNC: 3.8 G/DL (ref 3.2–5.2)
ALP SERPL-CCNC: 78 U/L (ref 46–116)
ALP SERPL-CCNC: 79 U/L (ref 46–116)
ALT SERPL W P-5'-P-CCNC: 18 U/L (ref 7–40)
ALT SERPL W P-5'-P-CCNC: 18 U/L (ref 7–40)
AST SERPL-CCNC: 18 U/L (ref ?–34)
AST SERPL-CCNC: 18 U/L (ref ?–34)
BASOPHILS # BLD AUTO: 0.1 10^3/UL (ref 0–0.2)
BASOPHILS # BLD AUTO: 0.1 10^3/UL (ref 0–0.2)
BASOPHILS NFR BLD AUTO: 0.8 % (ref 0–1)
BASOPHILS NFR BLD AUTO: 1.1 % (ref 0–1)
BILIRUB SERPL-MCNC: 0.5 MG/DL (ref 0.3–1.2)
BILIRUB SERPL-MCNC: 0.5 MG/DL (ref 0.3–1.2)
BUN SERPL-MCNC: 19 MG/DL (ref 9–23)
BUN SERPL-MCNC: 19 MG/DL (ref 9–23)
CALCIUM SERPL-MCNC: 9.1 MG/DL (ref 8.3–10.6)
CALCIUM SERPL-MCNC: 9.4 MG/DL (ref 8.3–10.6)
CHLORIDE SERPL-SCNC: 102 MMOL/L (ref 98–107)
CHLORIDE SERPL-SCNC: 102 MMOL/L (ref 98–107)
CHOLEST SERPL-MCNC: 136 MG/DL (ref ?–200)
CHOLEST/HDLC SERPL: 1.69 {RATIO} (ref ?–5)
CO2 SERPL-SCNC: 30 MMOL/L (ref 20–31)
CO2 SERPL-SCNC: 30 MMOL/L (ref 20–31)
CREAT SERPL-MCNC: 0.64 MG/DL (ref 0.55–1.3)
CREAT SERPL-MCNC: 0.64 MG/DL (ref 0.55–1.3)
EOSINOPHIL # BLD AUTO: 0.1 10^3/UL (ref 0–0.5)
EOSINOPHIL # BLD AUTO: 0.2 10^3/UL (ref 0–0.5)
EOSINOPHIL NFR BLD AUTO: 1.9 % (ref 0–3)
EOSINOPHIL NFR BLD AUTO: 2.5 % (ref 0–3)
FOLATE SERPL-MCNC: 15.2 NG/ML (ref 5.4–?)
GFR SERPL CREATININE-BSD FRML MDRD: > 60 ML/MIN/{1.73_M2} (ref 45–?)
GFR SERPL CREATININE-BSD FRML MDRD: > 60 ML/MIN/{1.73_M2} (ref 45–?)
GLUCOSE SERPL-MCNC: 95 MG/DL (ref 74–106)
GLUCOSE SERPL-MCNC: 97 MG/DL (ref 74–106)
HCT VFR BLD AUTO: 40.2 % (ref 36–47)
HCT VFR BLD AUTO: 40.3 % (ref 36–47)
HDLC SERPL-MCNC: 80.3 MG/DL (ref 40–?)
HGB BLD-MCNC: 13.3 G/DL (ref 12–15.5)
HGB BLD-MCNC: 13.5 G/DL (ref 12–15.5)
LDLC SERPL CALC-MCNC: 25.7 MG/DL (ref ?–100)
LYMPHOCYTES # BLD AUTO: 1.9 10^3/UL (ref 1.5–5)
LYMPHOCYTES # BLD AUTO: 1.9 10^3/UL (ref 1.5–5)
LYMPHOCYTES NFR BLD AUTO: 30.6 % (ref 24–44)
LYMPHOCYTES NFR BLD AUTO: 31 % (ref 24–44)
MCH RBC QN AUTO: 31.8 PG (ref 27–33)
MCH RBC QN AUTO: 32.4 PG (ref 27–33)
MCHC RBC AUTO-ENTMCNC: 33 G/DL (ref 32–36.5)
MCHC RBC AUTO-ENTMCNC: 33.6 G/DL (ref 32–36.5)
MCV RBC AUTO: 96.4 FL (ref 80–96)
MCV RBC AUTO: 96.4 FL (ref 80–96)
MONOCYTES # BLD AUTO: 0.4 10^3/UL (ref 0–0.8)
MONOCYTES # BLD AUTO: 0.4 10^3/UL (ref 0–0.8)
MONOCYTES NFR BLD AUTO: 6.8 % (ref 2–8)
MONOCYTES NFR BLD AUTO: 6.8 % (ref 2–8)
NEUTROPHILS # BLD AUTO: 3.7 10^3/UL (ref 1.5–8.5)
NEUTROPHILS # BLD AUTO: 3.7 10^3/UL (ref 1.5–8.5)
NEUTROPHILS NFR BLD AUTO: 58.7 % (ref 36–66)
NEUTROPHILS NFR BLD AUTO: 59.3 % (ref 36–66)
NONHDLC SERPL-MCNC: 56 MG/DL
PLATELET # BLD AUTO: 353 10^3/UL (ref 150–450)
PLATELET # BLD AUTO: 365 10^3/UL (ref 150–450)
POTASSIUM SERPL-SCNC: 4.3 MMOL/L (ref 3.5–5.1)
POTASSIUM SERPL-SCNC: 4.3 MMOL/L (ref 3.5–5.1)
PROT SERPL-MCNC: 6.6 G/DL (ref 5.7–8.2)
PROT SERPL-MCNC: 6.6 G/DL (ref 5.7–8.2)
RBC # BLD AUTO: 4.17 10^6/UL (ref 4–5.4)
RBC # BLD AUTO: 4.18 10^6/UL (ref 4–5.4)
SODIUM SERPL-SCNC: 135 MMOL/L (ref 136–145)
SODIUM SERPL-SCNC: 136 MMOL/L (ref 136–145)
T4 FREE SERPL-MCNC: 1.32 NG/DL (ref 0.89–1.76)
TRIGL SERPL-MCNC: 150 MG/DL (ref ?–150)
TSH SERPL DL<=0.005 MIU/L-ACNC: 0.77 UIU/ML (ref 0.55–4.78)
VIT B12 SERPL-MCNC: 429 PG/ML (ref 211–911)
WBC # BLD AUTO: 6.2 10^3/UL (ref 4–10)
WBC # BLD AUTO: 6.4 10^3/UL (ref 4–10)

## 2023-03-02 ENCOUNTER — HOSPITAL ENCOUNTER (OUTPATIENT)
Dept: HOSPITAL 53 - M RAD | Age: 68
End: 2023-03-02
Attending: FAMILY MEDICINE
Payer: MEDICARE

## 2023-03-02 DIAGNOSIS — J39.8: ICD-10-CM

## 2023-03-02 DIAGNOSIS — Z98.890: Primary | ICD-10-CM

## 2023-10-16 ENCOUNTER — HOSPITAL ENCOUNTER (OUTPATIENT)
Dept: HOSPITAL 53 - M SFHCPLAZ | Age: 68
End: 2023-10-16
Attending: PHYSICIAN ASSISTANT
Payer: MEDICARE

## 2023-10-16 DIAGNOSIS — R05.8: Primary | ICD-10-CM

## 2023-11-20 ENCOUNTER — HOSPITAL ENCOUNTER (OUTPATIENT)
Dept: HOSPITAL 53 - M WHC | Age: 68
End: 2023-11-20
Attending: FAMILY MEDICINE
Payer: MEDICARE

## 2023-11-20 DIAGNOSIS — Z12.31: Primary | ICD-10-CM

## 2023-11-20 DIAGNOSIS — M81.0: ICD-10-CM

## 2024-02-29 ENCOUNTER — HOSPITAL ENCOUNTER (OUTPATIENT)
Dept: HOSPITAL 53 - M RAD | Age: 69
End: 2024-02-29
Attending: FAMILY MEDICINE
Payer: MEDICARE

## 2024-02-29 DIAGNOSIS — Z87.891: Primary | ICD-10-CM

## 2024-04-09 ENCOUNTER — HOSPITAL ENCOUNTER (OUTPATIENT)
Dept: HOSPITAL 53 - M LAB | Age: 69
End: 2024-04-09
Attending: FAMILY MEDICINE
Payer: MEDICARE

## 2024-04-09 ENCOUNTER — HOSPITAL ENCOUNTER (OUTPATIENT)
Dept: HOSPITAL 53 - M RAD | Age: 69
End: 2024-04-09
Attending: OTOLARYNGOLOGY
Payer: MEDICARE

## 2024-04-09 DIAGNOSIS — M19.90: Primary | ICD-10-CM

## 2024-04-09 DIAGNOSIS — E04.1: Primary | ICD-10-CM

## 2024-04-09 LAB
25(OH)D3 SERPL-MCNC: 65.8 NG/ML (ref 20–100)
ALBUMIN SERPL BCG-MCNC: 4 G/DL (ref 3.2–5.2)
BUN SERPL-MCNC: 16 MG/DL (ref 9–23)
CALCIUM SERPL-MCNC: 8.9 MG/DL (ref 8.3–10.6)
CHLORIDE SERPL-SCNC: 102 MMOL/L (ref 98–107)
CO2 SERPL-SCNC: 28 MMOL/L (ref 20–31)
CREAT SERPL-MCNC: 0.63 MG/DL (ref 0.55–1.3)
GFR SERPL CREATININE-BSD FRML MDRD: > 60 ML/MIN/{1.73_M2} (ref 45–?)
GLUCOSE SERPL-MCNC: 104 MG/DL (ref 74–106)
PHOSPHATE SERPL-MCNC: 3.2 MG/DL (ref 2.4–5.1)
POTASSIUM SERPL-SCNC: 4.5 MMOL/L (ref 3.5–5.1)
SODIUM SERPL-SCNC: 136 MMOL/L (ref 136–145)
URATE SERPL-MCNC: 3.2 MG/DL (ref 3.1–7.8)

## 2024-09-13 ENCOUNTER — HOSPITAL ENCOUNTER (OUTPATIENT)
Dept: HOSPITAL 53 - M LAB | Age: 69
End: 2024-09-13
Attending: PSYCHIATRY & NEUROLOGY
Payer: MEDICARE

## 2024-09-13 DIAGNOSIS — R56.9: Primary | ICD-10-CM

## 2024-09-13 LAB
ALBUMIN SERPL BCG-MCNC: 4 G/DL (ref 3.2–5.2)
ALP SERPL-CCNC: 92 U/L (ref 46–116)
ALT SERPL W P-5'-P-CCNC: 16 U/L (ref 7–40)
AST SERPL-CCNC: 14 U/L (ref ?–34)
BASOPHILS # BLD AUTO: 0.1 10^3/UL (ref 0–0.2)
BASOPHILS NFR BLD AUTO: 0.7 % (ref 0–1)
BILIRUB SERPL-MCNC: 0.4 MG/DL (ref 0.3–1.2)
BUN SERPL-MCNC: 12 MG/DL (ref 9–23)
CALCIUM SERPL-MCNC: 9.4 MG/DL (ref 8.3–10.6)
CHLORIDE SERPL-SCNC: 106 MMOL/L (ref 98–107)
CO2 SERPL-SCNC: 29 MMOL/L (ref 20–31)
CREAT SERPL-MCNC: 0.66 MG/DL (ref 0.55–1.3)
EOSINOPHIL # BLD AUTO: 0.2 10^3/UL (ref 0–0.5)
EOSINOPHIL NFR BLD AUTO: 2.2 % (ref 0–3)
GFR SERPL CREATININE-BSD FRML MDRD: > 60 ML/MIN/{1.73_M2} (ref 45–?)
GLUCOSE SERPL-MCNC: 100 MG/DL (ref 74–106)
HCT VFR BLD AUTO: 40.2 % (ref 36–47)
HGB BLD-MCNC: 13.6 G/DL (ref 12–15.5)
LYMPHOCYTES # BLD AUTO: 2 10^3/UL (ref 1.5–5)
LYMPHOCYTES NFR BLD AUTO: 27.3 % (ref 24–44)
MCH RBC QN AUTO: 33.1 PG (ref 27–33)
MCHC RBC AUTO-ENTMCNC: 33.8 G/DL (ref 32–36.5)
MCV RBC AUTO: 97.8 FL (ref 80–96)
MONOCYTES # BLD AUTO: 0.5 10^3/UL (ref 0–0.8)
MONOCYTES NFR BLD AUTO: 7.4 % (ref 2–8)
NEUTROPHILS # BLD AUTO: 4.4 10^3/UL (ref 1.5–8.5)
NEUTROPHILS NFR BLD AUTO: 62.1 % (ref 36–66)
PLATELET # BLD AUTO: 273 10^3/UL (ref 150–450)
POTASSIUM SERPL-SCNC: 4.5 MMOL/L (ref 3.5–5.1)
PROT SERPL-MCNC: 6.9 G/DL (ref 5.7–8.2)
RBC # BLD AUTO: 4.11 10^6/UL (ref 4–5.4)
SODIUM SERPL-SCNC: 137 MMOL/L (ref 136–145)
WBC # BLD AUTO: 7.1 10^3/UL (ref 4–10)

## 2024-12-05 ENCOUNTER — HOSPITAL ENCOUNTER (OUTPATIENT)
Dept: HOSPITAL 53 - M WHC | Age: 69
End: 2024-12-05
Attending: FAMILY MEDICINE
Payer: MEDICARE

## 2024-12-05 DIAGNOSIS — Z12.31: Primary | ICD-10-CM

## 2025-01-27 ENCOUNTER — HOSPITAL ENCOUNTER (OUTPATIENT)
Dept: HOSPITAL 53 - M WUC | Age: 70
End: 2025-01-27
Attending: FAMILY MEDICINE
Payer: MEDICARE

## 2025-01-27 DIAGNOSIS — M19.042: Primary | ICD-10-CM

## 2025-01-27 DIAGNOSIS — M19.041: ICD-10-CM

## 2025-01-28 ENCOUNTER — HOSPITAL ENCOUNTER (OUTPATIENT)
Dept: HOSPITAL 53 - M WUC | Age: 70
End: 2025-01-28
Attending: FAMILY MEDICINE
Payer: MEDICARE

## 2025-01-28 DIAGNOSIS — E78.00: ICD-10-CM

## 2025-01-28 DIAGNOSIS — Z00.00: Primary | ICD-10-CM

## 2025-01-28 LAB
ALBUMIN SERPL BCG-MCNC: 3.8 G/DL (ref 3.2–5.2)
ALP SERPL-CCNC: 87 U/L (ref 35–104)
ALT SERPL W P-5'-P-CCNC: 13 U/L (ref 7–40)
AST SERPL-CCNC: 12 U/L (ref ?–34)
BASOPHILS # BLD AUTO: 0.1 10^3/UL (ref 0–0.2)
BASOPHILS NFR BLD AUTO: 1.2 % (ref 0–1)
BILIRUB SERPL-MCNC: 0.4 MG/DL (ref 0.3–1.2)
BUN SERPL-MCNC: 20 MG/DL (ref 9–23)
CALCIUM SERPL-MCNC: 8.8 MG/DL (ref 8.3–10.6)
CHLORIDE SERPL-SCNC: 104 MMOL/L (ref 98–107)
CHOLEST SERPL-MCNC: 163 MG/DL (ref ?–200)
CHOLEST/HDLC SERPL: 1.63 {RATIO} (ref ?–5)
CO2 SERPL-SCNC: 31 MMOL/L (ref 20–31)
CREAT SERPL-MCNC: 0.64 MG/DL (ref 0.55–1.3)
EOSINOPHIL # BLD AUTO: 0.2 10^3/UL (ref 0–0.5)
EOSINOPHIL NFR BLD AUTO: 3.4 % (ref 0–3)
GFR SERPL CREATININE-BSD FRML MDRD: > 60 ML/MIN/{1.73_M2} (ref 45–?)
GLUCOSE SERPL-MCNC: 100 MG/DL (ref 74–106)
HCT VFR BLD AUTO: 39.1 % (ref 36–47)
HDLC SERPL-MCNC: 99.4 MG/DL (ref 40–?)
HGB BLD-MCNC: 13.1 G/DL (ref 12–15.5)
LDLC SERPL CALC-MCNC: 44.4 MG/DL (ref ?–100)
LYMPHOCYTES # BLD AUTO: 2 10^3/UL (ref 1.5–5)
LYMPHOCYTES NFR BLD AUTO: 34.9 % (ref 24–44)
MCH RBC QN AUTO: 33.1 PG (ref 27–33)
MCHC RBC AUTO-ENTMCNC: 33.5 G/DL (ref 32–36.5)
MCV RBC AUTO: 98.7 FL (ref 80–96)
MONOCYTES # BLD AUTO: 0.5 10^3/UL (ref 0–0.8)
MONOCYTES NFR BLD AUTO: 9 % (ref 2–8)
NEUTROPHILS # BLD AUTO: 2.9 10^3/UL (ref 1.5–8.5)
NEUTROPHILS NFR BLD AUTO: 51.3 % (ref 36–66)
NONHDLC SERPL-MCNC: 63.6 MG/DL
PLATELET # BLD AUTO: 287 10^3/UL (ref 150–450)
POTASSIUM SERPL-SCNC: 4.7 MMOL/L (ref 3.5–5.1)
PROT SERPL-MCNC: 6.8 G/DL (ref 5.7–8.2)
RBC # BLD AUTO: 3.96 10^6/UL (ref 4–5.4)
SODIUM SERPL-SCNC: 140 MMOL/L (ref 136–145)
TRIGL SERPL-MCNC: 96 MG/DL (ref ?–150)
WBC # BLD AUTO: 5.7 10^3/UL (ref 4–10)

## 2025-04-01 ENCOUNTER — HOSPITAL ENCOUNTER (OUTPATIENT)
Dept: HOSPITAL 53 - M RAD | Age: 70
End: 2025-04-01
Attending: OTOLARYNGOLOGY
Payer: MEDICARE

## 2025-04-01 DIAGNOSIS — E04.1: Primary | ICD-10-CM

## 2025-05-02 ENCOUNTER — HOSPITAL ENCOUNTER (OUTPATIENT)
Dept: HOSPITAL 53 - M RAD | Age: 70
End: 2025-05-02
Attending: FAMILY MEDICINE
Payer: MEDICARE

## 2025-05-02 DIAGNOSIS — Z87.891: Primary | ICD-10-CM

## 2025-05-06 ENCOUNTER — HOSPITAL ENCOUNTER (OUTPATIENT)
Dept: HOSPITAL 53 - M IRPRO | Age: 70
End: 2025-05-06
Attending: PHYSICIAN ASSISTANT
Payer: MEDICARE

## 2025-05-06 DIAGNOSIS — Z53.9: ICD-10-CM

## 2025-05-06 DIAGNOSIS — E04.2: Primary | ICD-10-CM

## 2025-05-13 ENCOUNTER — HOSPITAL ENCOUNTER (OUTPATIENT)
Dept: HOSPITAL 53 - M WUC | Age: 70
End: 2025-05-13
Attending: STUDENT IN AN ORGANIZED HEALTH CARE EDUCATION/TRAINING PROGRAM
Payer: MEDICARE
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
## 2025-05-13 DIAGNOSIS — S23.41XA: Primary | ICD-10-CM
